# Patient Record
Sex: FEMALE | Race: WHITE | NOT HISPANIC OR LATINO | Employment: FULL TIME | URBAN - METROPOLITAN AREA
[De-identification: names, ages, dates, MRNs, and addresses within clinical notes are randomized per-mention and may not be internally consistent; named-entity substitution may affect disease eponyms.]

---

## 2023-05-09 PROBLEM — R01.1 CARDIAC MURMUR: Status: ACTIVE | Noted: 2023-05-09

## 2023-05-09 PROBLEM — B34.9 VIRAL ILLNESS: Status: ACTIVE | Noted: 2023-05-09

## 2023-05-09 PROBLEM — J02.9 PHARYNGITIS, ACUTE: Status: ACTIVE | Noted: 2023-05-09

## 2023-05-09 PROBLEM — R10.9 ABDOMINAL PAIN, ACUTE: Status: ACTIVE | Noted: 2023-05-09

## 2023-05-09 PROBLEM — R11.0 NAUSEA IN ADULT: Status: ACTIVE | Noted: 2023-05-09

## 2023-05-09 PROBLEM — J02.9 SORE THROAT: Status: ACTIVE | Noted: 2023-05-09

## 2023-05-09 PROBLEM — A08.4 VIRAL GASTROENTERITIS: Status: ACTIVE | Noted: 2023-05-09

## 2023-05-09 PROBLEM — K21.9 CHRONIC GERD: Status: ACTIVE | Noted: 2023-05-09

## 2023-05-09 PROBLEM — R00.2 PALPITATIONS: Status: ACTIVE | Noted: 2023-05-09

## 2023-05-09 PROBLEM — R07.9 CHEST PAIN: Status: ACTIVE | Noted: 2023-05-09

## 2023-05-09 PROBLEM — R42 DIZZINESS: Status: ACTIVE | Noted: 2023-05-09

## 2023-05-09 PROBLEM — M77.9 TENDONITIS: Status: ACTIVE | Noted: 2023-05-09

## 2023-05-09 PROBLEM — S46.911A SHOULDER STRAIN, RIGHT, INITIAL ENCOUNTER: Status: ACTIVE | Noted: 2023-05-09

## 2023-05-09 RX ORDER — GUAIFENESIN 1200 MG
325 TABLET, EXTENDED RELEASE 12 HR ORAL
COMMUNITY

## 2023-05-09 RX ORDER — CALCIUM CARBONATE 200(500)MG
1 TABLET,CHEWABLE ORAL DAILY
COMMUNITY

## 2023-05-09 RX ORDER — MELATONIN 10 MG
10 CAPSULE ORAL
COMMUNITY
Start: 2021-11-18 | End: 2023-05-11

## 2023-05-09 RX ORDER — NORETHINDRONE ACETATE AND ETHINYL ESTRADIOL 1MG-20(24)
1 KIT ORAL DAILY
COMMUNITY
Start: 2021-09-15 | End: 2023-05-11 | Stop reason: ALTCHOICE

## 2023-05-09 RX ORDER — PANTOPRAZOLE SODIUM 40 MG/1
1 TABLET, DELAYED RELEASE ORAL DAILY
COMMUNITY
End: 2023-05-11 | Stop reason: ALTCHOICE

## 2023-05-09 RX ORDER — ALBUTEROL SULFATE 90 UG/1
2 AEROSOL, METERED RESPIRATORY (INHALATION) EVERY 4 HOURS PRN
COMMUNITY
Start: 2021-09-29

## 2023-05-09 RX ORDER — ONDANSETRON 4 MG/1
4 TABLET, ORALLY DISINTEGRATING ORAL 4 TIMES DAILY
COMMUNITY
Start: 2022-06-20

## 2023-05-11 ENCOUNTER — OFFICE VISIT (OUTPATIENT)
Dept: PRIMARY CARE | Facility: CLINIC | Age: 23
End: 2023-05-11
Payer: COMMERCIAL

## 2023-05-11 VITALS
SYSTOLIC BLOOD PRESSURE: 122 MMHG | BODY MASS INDEX: 39.01 KG/M2 | HEIGHT: 62 IN | HEART RATE: 88 BPM | DIASTOLIC BLOOD PRESSURE: 80 MMHG | WEIGHT: 212 LBS

## 2023-05-11 DIAGNOSIS — M25.539 PAIN IN WRIST, UNSPECIFIED LATERALITY: Primary | ICD-10-CM

## 2023-05-11 DIAGNOSIS — R20.2 NUMBNESS AND TINGLING: ICD-10-CM

## 2023-05-11 DIAGNOSIS — R20.0 NUMBNESS AND TINGLING: ICD-10-CM

## 2023-05-11 DIAGNOSIS — Z76.89 ENCOUNTER TO ESTABLISH CARE: ICD-10-CM

## 2023-05-11 DIAGNOSIS — M25.50 ARTHRALGIA, UNSPECIFIED JOINT: ICD-10-CM

## 2023-05-11 DIAGNOSIS — Z00.00 HEALTHCARE MAINTENANCE: ICD-10-CM

## 2023-05-11 PROBLEM — R10.9 ABDOMINAL PAIN, ACUTE: Status: RESOLVED | Noted: 2023-05-09 | Resolved: 2023-05-11

## 2023-05-11 PROBLEM — J02.9 SORE THROAT: Status: RESOLVED | Noted: 2023-05-09 | Resolved: 2023-05-11

## 2023-05-11 PROBLEM — B34.9 VIRAL ILLNESS: Status: RESOLVED | Noted: 2023-05-09 | Resolved: 2023-05-11

## 2023-05-11 PROBLEM — J02.9 PHARYNGITIS, ACUTE: Status: RESOLVED | Noted: 2023-05-09 | Resolved: 2023-05-11

## 2023-05-11 PROBLEM — K21.9 CHRONIC GERD: Status: RESOLVED | Noted: 2023-05-09 | Resolved: 2023-05-11

## 2023-05-11 PROBLEM — R11.0 NAUSEA IN ADULT: Status: RESOLVED | Noted: 2023-05-09 | Resolved: 2023-05-11

## 2023-05-11 PROBLEM — A08.4 VIRAL GASTROENTERITIS: Status: RESOLVED | Noted: 2023-05-09 | Resolved: 2023-05-11

## 2023-05-11 PROCEDURE — 1036F TOBACCO NON-USER: CPT | Performed by: CLINICAL NURSE SPECIALIST

## 2023-05-11 PROCEDURE — 99203 OFFICE O/P NEW LOW 30 MIN: CPT | Performed by: CLINICAL NURSE SPECIALIST

## 2023-05-11 RX ORDER — NORELGESTROMIN AND ETHINYL ESTRADIOL 150; 35 UG/D; UG/D
1 PATCH TRANSDERMAL
COMMUNITY

## 2023-05-11 ASSESSMENT — ENCOUNTER SYMPTOMS
WOUND: 0
SEIZURES: 0
MYALGIAS: 0
VOMITING: 0
DIARRHEA: 0
PHOTOPHOBIA: 0
FEVER: 0
HEADACHES: 0
NAUSEA: 0
CHEST TIGHTNESS: 0
CONSTIPATION: 0
ABDOMINAL PAIN: 0
DIZZINESS: 0
LOSS OF SENSATION IN FEET: 0
CONFUSION: 0
SHORTNESS OF BREATH: 0
PALPITATIONS: 0
SORE THROAT: 0
WHEEZING: 0
COUGH: 0
JOINT SWELLING: 0
POLYDIPSIA: 0
BACK PAIN: 0
OCCASIONAL FEELINGS OF UNSTEADINESS: 0
DEPRESSION: 1
ARTHRALGIAS: 1
TROUBLE SWALLOWING: 0
HEMATURIA: 0
BRUISES/BLEEDS EASILY: 0
NUMBNESS: 1
APPETITE CHANGE: 0
UNEXPECTED WEIGHT CHANGE: 0
FLANK PAIN: 0
ACTIVITY CHANGE: 0
DYSURIA: 0
BLOOD IN STOOL: 0
FATIGUE: 0
EYE PAIN: 0
NECK PAIN: 0
SLEEP DISTURBANCE: 0
CHILLS: 0

## 2023-05-11 ASSESSMENT — PATIENT HEALTH QUESTIONNAIRE - PHQ9
2. FEELING DOWN, DEPRESSED OR HOPELESS: SEVERAL DAYS
SUM OF ALL RESPONSES TO PHQ9 QUESTIONS 1 AND 2: 1
1. LITTLE INTEREST OR PLEASURE IN DOING THINGS: NOT AT ALL

## 2023-05-11 ASSESSMENT — COLUMBIA-SUICIDE SEVERITY RATING SCALE - C-SSRS
2. HAVE YOU ACTUALLY HAD ANY THOUGHTS OF KILLING YOURSELF?: NO
6. HAVE YOU EVER DONE ANYTHING, STARTED TO DO ANYTHING, OR PREPARED TO DO ANYTHING TO END YOUR LIFE?: NO
1. IN THE PAST MONTH, HAVE YOU WISHED YOU WERE DEAD OR WISHED YOU COULD GO TO SLEEP AND NOT WAKE UP?: NO

## 2023-05-11 NOTE — PROGRESS NOTES
Subjective   Patient ID: Mariam Tejada is a 22 y.o. female who presents for Establish Care (Bilateral arm pain Worse in the right arm /Patient states muscle feels tight ).  HPI    New patient here today to establish care.  Has been years since she has seen a PCP. Patient states that she has been having issues with right arm pain. Generalized joint discomfort as well. Has been having worse issues with the right arm pain for the past two weeks. Wrist pain aggravated. Works at [employer]. 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 At times can be better off of work. Has tried braces/baths. Without relief. No improvement with elevation. Numbness/tingling. Thought it was Carpal Tunnel. Denies any testing in the past for evaluation.     Generalized discomfort has been ongoing. Mother had Autoimmune.     Review of Systems   Constitutional:  Negative for activity change, appetite change, chills, fatigue, fever and unexpected weight change.   HENT:  Negative for ear pain, hearing loss, nosebleeds, sore throat, tinnitus and trouble swallowing.    Eyes:  Negative for photophobia, pain and visual disturbance.   Respiratory:  Negative for cough, chest tightness, shortness of breath and wheezing.    Cardiovascular:  Negative for chest pain, palpitations and leg swelling.   Gastrointestinal:  Negative for abdominal pain, blood in stool, constipation, diarrhea, nausea and vomiting.   Endocrine: Negative for cold intolerance, heat intolerance, polydipsia and polyuria.   Genitourinary:  Negative for dysuria, flank pain and hematuria.   Musculoskeletal:  Positive for arthralgias. Negative for back pain, joint swelling, myalgias and neck pain.   Skin:  Negative for pallor, rash and wound.   Allergic/Immunologic: Negative for immunocompromised state.   Neurological:  Positive for numbness. Negative for dizziness, seizures and headaches.   Hematological:  Does not bruise/bleed easily.   Psychiatric/Behavioral:  Negative for confusion and  sleep disturbance.        Objective   Physical Exam  Vitals and nursing note reviewed.   Constitutional:       General: She is not in acute distress.     Appearance: Normal appearance.   HENT:      Head: Normocephalic.      Nose: Nose normal.   Eyes:      Conjunctiva/sclera: Conjunctivae normal.   Neck:      Vascular: No carotid bruit.   Cardiovascular:      Rate and Rhythm: Normal rate and regular rhythm.      Pulses: Normal pulses.      Heart sounds: Normal heart sounds.   Pulmonary:      Effort: Pulmonary effort is normal.      Breath sounds: Normal breath sounds.   Abdominal:      General: Bowel sounds are normal.      Palpations: Abdomen is soft.   Musculoskeletal:         General: Normal range of motion.      Cervical back: Normal range of motion.   Skin:     General: Skin is warm and dry.   Neurological:      Mental Status: She is alert and oriented to person, place, and time. Mental status is at baseline.   Psychiatric:         Mood and Affect: Mood normal.         Behavior: Behavior normal.         Assessment/Plan       Lab work ordered. Will follow for results.      Arthralgia, Pain, Numbness: NCT & EMG ordered. Will follow up pending results.